# Patient Record
Sex: FEMALE | Race: WHITE | URBAN - METROPOLITAN AREA
[De-identification: names, ages, dates, MRNs, and addresses within clinical notes are randomized per-mention and may not be internally consistent; named-entity substitution may affect disease eponyms.]

---

## 2021-10-04 ENCOUNTER — OFFICE VISIT (OUTPATIENT)
Dept: URBAN - METROPOLITAN AREA CLINIC 19 | Facility: CLINIC | Age: 61
End: 2021-10-04
Payer: COMMERCIAL

## 2021-10-04 ENCOUNTER — WEB ENCOUNTER (OUTPATIENT)
Dept: URBAN - METROPOLITAN AREA CLINIC 19 | Facility: CLINIC | Age: 61
End: 2021-10-04

## 2021-10-04 VITALS
HEIGHT: 63 IN | SYSTOLIC BLOOD PRESSURE: 122 MMHG | DIASTOLIC BLOOD PRESSURE: 84 MMHG | HEART RATE: 74 BPM | TEMPERATURE: 98.6 F | WEIGHT: 204 LBS | BODY MASS INDEX: 36.14 KG/M2

## 2021-10-04 DIAGNOSIS — R10.11 RIGHT UPPER QUADRANT ABDOMINAL PAIN: ICD-10-CM

## 2021-10-04 PROBLEM — 301717006: Status: ACTIVE | Noted: 2021-10-04

## 2021-10-04 PROCEDURE — 99204 OFFICE O/P NEW MOD 45 MIN: CPT | Performed by: NURSE PRACTITIONER

## 2021-10-04 NOTE — HPI-TODAY'S VISIT:
Ms. Ocasio is a 61 year-old female who presents today for RUQ pain. She reports she had Covid in April and was hospitalized.  She was also having heart issues at that time.  It is unclear when RUQ began with her other health issues, but she does believe it began around May 2021.  The pain increases with eating.  She has been taking diclofenac, which helps.  She also reports a fever and sweating after eating as well as diarrhea and nausea. She reports watery diarrhea 3-4 times after she eats- has been occurring for a month and depends on what she eats. She also notes the pain is radiating into her right arm.  She has been trying to adjust her diet and has cut out fat.  She does report she had a breakfast sandwich from [employer] on Saturday morning and had severe pain after.  She does report more frequent heartburn and reflux now.  History of gastric bypass in 2008/10.  Reports she has never had a colonoscopy, but did Cologuard last year- negative. Thought she was seeing a surgeon today.   CMP 9/12/2021 bilirubin less than 0.2, alk phos 87, AST 22, ALT 23, amylase 71, lipase 51.   RUQ /21-partially contracted gallbladder with suspected 4 mm polyp.  Sonographic Muñoz sign.  Clinical correlation is advised for cholecystitis.  No bile duct dilatation.

## 2021-10-04 NOTE — PHYSICAL EXAM GASTROINTESTINAL
Abdomen, soft, generalized tenderness- worst RUQ with guarding, nondistended, no masses palpable, normal bowel sounds, Liver and Spleen, no hepatomegaly present, no hepatosplenomegaly,  Rectal, deferred

## 2021-10-27 ENCOUNTER — WEB ENCOUNTER (OUTPATIENT)
Dept: URBAN - METROPOLITAN AREA CLINIC 19 | Facility: CLINIC | Age: 61
End: 2021-10-27

## 2021-10-27 ENCOUNTER — OFFICE VISIT (OUTPATIENT)
Dept: URBAN - METROPOLITAN AREA CLINIC 19 | Facility: CLINIC | Age: 61
End: 2021-10-27
Payer: COMMERCIAL

## 2021-10-27 VITALS
SYSTOLIC BLOOD PRESSURE: 132 MMHG | BODY MASS INDEX: 36.86 KG/M2 | DIASTOLIC BLOOD PRESSURE: 76 MMHG | HEIGHT: 63 IN | WEIGHT: 208 LBS | TEMPERATURE: 98.7 F

## 2021-10-27 DIAGNOSIS — K21.9 GASTROESOPHAGEAL REFLUX DISEASE, UNSPECIFIED WHETHER ESOPHAGITIS PRESENT: ICD-10-CM

## 2021-10-27 DIAGNOSIS — R10.84 GENERALIZED ABDOMINAL PAIN: ICD-10-CM

## 2021-10-27 PROBLEM — 235595009: Status: ACTIVE | Noted: 2021-10-27

## 2021-10-27 PROBLEM — 102614006: Status: ACTIVE | Noted: 2021-10-27

## 2021-10-27 PROBLEM — 79922009: Status: ACTIVE | Noted: 2021-10-27

## 2021-10-27 PROCEDURE — 99214 OFFICE O/P EST MOD 30 MIN: CPT | Performed by: NURSE PRACTITIONER

## 2021-10-27 RX ORDER — PANTOPRAZOLE SODIUM 40 MG/1
1 TABLET TABLET, DELAYED RELEASE ORAL ONCE A DAY
Qty: 90 TABLET | Refills: 1 | OUTPATIENT
Start: 2021-10-27

## 2021-10-27 NOTE — PHYSICAL EXAM GASTROINTESTINAL
Abdomen, soft, nontender, nondistended, no guarding or rigidity, no masses palpable, normal bowel sounds, lap sites C/D/I Liver and Spleen, no hepatomegaly present, no hepatosplenomegaly,  Rectal, deferred

## 2021-10-27 NOTE — HPI-TODAY'S VISIT:
10/4/21: Ms. Ocasio is a 61 year-old female who presents today for RUQ pain. She reports she had Covid in April and was hospitalized.  She was also having heart issues at that time.  It is unclear when RUQ began with her other health issues, but she does believe it began around May 2021.  The pain increases with eating.  She has been taking diclofenac, which helps.  She also reports a fever and sweating after eating as well as diarrhea and nausea. She reports watery diarrhea 3-4 times after she eats- has been occurring for a month and depends on what she eats. She also notes the pain is radiating into her right arm.  She has been trying to adjust her diet and has cut out fat.  She does report she had a breakfast sandwich from [employer] on Saturday morning and had severe pain after.  She does report more frequent heartburn and reflux now.  History of gastric bypass in 2008/10.  Reports she has never had a colonoscopy, but did Cologuard last year- negative. Thought she was seeing a surgeon today. CMP 9/12/2021 bili less than 0.2, alk phos 87, AST 22, ALT 23, amylase 71, lipase 51. RUQ /21-partially contracted gallbladder with suspected 4 mm polyp.  Sonographic Muñoz sign.  Clinical correlation is advised for cholecystitis.  No bile duct dilatation.  10/27/21: Ms. Ocasio presents today following up on RUQ pain. She presented to [facility] on 10/6 for increasing right upper quadrant abdominal pain.  After I had seen her last, she had made an appointment with Dr. Sahu to be evaluated 10/7/2021, but was unable to control the pain.  CT ab/pelvis- expected postsurgical changes status post gastric bypass procedure.  A small hiatal hernia is noted.  Mild left-sided diverticulosis without evidence of diverticulitis.  No acute intra-abdominal process. RUQ US- small gallbladder polyps without evidence of gallstones, ductal dilation, or gallbladder wall thickening.  Sonographic Muñoz sign on today's study is negative. She had a lap bhanu on 10/6/2021 for cholecystitis with Dr. Peoples and was discharged on 10/7/2021.  Today, she reports she is feeling better after having her gallbladder out.  She does report when eating she will have sweating, tachycardia, and pain between her shoulder blades.  She reports some epigastric pain as well.  No heartburn.  Reports some reflux with eating a large amount of food.  Not on any medication for GERD.  She reports she is having 3-4 soft bowel movements a day now.  She was previously having diarrhea.  Reports she sees a cardiologist and her EKG and stress test recently were good.

## 2021-10-28 LAB
A/G RATIO: 2
ALBUMIN: 4.6
ALKALINE PHOSPHATASE: 95
ALT (SGPT): 19
AST (SGOT): 23
BILIRUBIN, TOTAL: 0.3
BUN/CREATININE RATIO: 19
BUN: 13
CALCIUM: 9.8
CARBON DIOXIDE, TOTAL: 23
CHLORIDE: 98
CREATININE: 0.69
EGFR IF AFRICN AM: 109
EGFR IF NONAFRICN AM: 95
GLOBULIN, TOTAL: 2.3
GLUCOSE: 87
HEMATOCRIT: 38.6
HEMOGLOBIN: 12.6
MCH: 30.4
MCHC: 32.6
MCV: 93
NRBC: (no result)
PLATELETS: 338
POTASSIUM: 4.6
PROTEIN, TOTAL: 6.9
RBC: 4.15
RDW: 14.3
SODIUM: 134
WBC: 5.7

## 2021-11-01 ENCOUNTER — DASHBOARD ENCOUNTERS (OUTPATIENT)
Age: 61
End: 2021-11-01